# Patient Record
Sex: MALE | Race: WHITE | URBAN - METROPOLITAN AREA
[De-identification: names, ages, dates, MRNs, and addresses within clinical notes are randomized per-mention and may not be internally consistent; named-entity substitution may affect disease eponyms.]

---

## 2017-01-03 ENCOUNTER — OFFICE VISIT (OUTPATIENT)
Dept: CARDIOLOGY CLINIC | Age: 67
End: 2017-01-03

## 2017-01-03 VITALS
HEIGHT: 69 IN | WEIGHT: 183.8 LBS | DIASTOLIC BLOOD PRESSURE: 76 MMHG | RESPIRATION RATE: 16 BRPM | HEART RATE: 60 BPM | SYSTOLIC BLOOD PRESSURE: 138 MMHG | BODY MASS INDEX: 27.22 KG/M2 | OXYGEN SATURATION: 98 %

## 2017-01-03 DIAGNOSIS — Z01.818 PRE-OP EXAM: Primary | ICD-10-CM

## 2017-01-03 RX ORDER — METFORMIN HYDROCHLORIDE 500 MG/1
TABLET ORAL
Refills: 5 | COMMUNITY
Start: 2016-12-29 | End: 2017-02-16 | Stop reason: DRUGHIGH

## 2017-01-03 RX ORDER — SITAGLIPTIN 50 MG/1
TABLET, FILM COATED ORAL
Refills: 3 | COMMUNITY
Start: 2016-12-29 | End: 2017-02-16 | Stop reason: DRUGHIGH

## 2017-01-03 RX ORDER — ASPIRIN 81 MG/1
TABLET ORAL DAILY
COMMUNITY

## 2017-01-03 RX ORDER — UREA 10 %
800 LOTION (ML) TOPICAL DAILY
COMMUNITY
End: 2017-02-16

## 2017-01-03 RX ORDER — CHOLECALCIFEROL (VITAMIN D3) 125 MCG
CAPSULE ORAL DAILY
COMMUNITY

## 2017-01-03 RX ORDER — ENALAPRIL MALEATE 20 MG/1
TABLET ORAL
Refills: 1 | COMMUNITY
Start: 2016-11-19 | End: 2017-02-16 | Stop reason: DRUGHIGH

## 2017-01-03 RX ORDER — BISMUTH SUBSALICYLATE 262 MG
1 TABLET,CHEWABLE ORAL DAILY
COMMUNITY

## 2017-01-03 NOTE — PROGRESS NOTES
Visit Vitals    /76 (BP 1 Location: Left arm, BP Patient Position: Sitting)    Pulse 60    Resp 16    Ht 5' 9\" (1.753 m)    Wt 183 lb 12.8 oz (83.4 kg)    SpO2 98%    BMI 27.14 kg/m2

## 2017-01-03 NOTE — PROGRESS NOTES
LAST OFFICE VISIT : Visit date not found        ICD-10-CM ICD-9-CM   1. Pre-op exam Z01.818 V72.84            Emily Lowery is a 77 y.o. male new patient referred for cardiac pre operative risk stratification. Cardiac risk factors: male gender, hypertension, diabetes mellitus. I have personally obtained the history from the patient. HISTORY OF PRESENTING ILLNESS      Presents for cardiac pre operative risk stratification. He has been having some left leg claudication symptoms and may be having bypass of his lower extremity CHARITO are 0.84 on the right and 0.52 on the left. He has history of diabetes mellitus and hypertension. He states he continues to have pain in legs with exertion such as golfing worse in the left leg. He is a former smoker. He has been a diabetic for 17 years for which he is on metformin. Hemoglobin A1C recently was 8.4. He had negative heart catheterization in the past with Dr Joao Geronimo. The patient denies chest pain/ shortness of breath, orthopnea, PND, LE edema, palpitations, syncope, presyncope or fatigue. ACTIVE PROBLEM LIST     There are no active problems to display for this patient.           PAST MEDICAL HISTORY     Past Medical History   Diagnosis Date    Diabetes ([facility] [address]     Essential hypertension     Vertigo            PAST SURGICAL HISTORY     Past Surgical History   Procedure Laterality Date    Hx cyst removal       Pilodinal, 1972    Hx heart catheterization       1999    Hx cataract removal Left 03/15/2016    Hx cataract removal Right 05/15/2016    Hx other surgical       Angiogram          ALLERGIES     No Known Allergies       FAMILY HISTORY     Family History   Problem Relation Age of Onset    Diabetes Father     Heart Surgery Father     Hypertension Father     Diabetes Brother     Hypertension Brother     negative for cardiac disease       SOCIAL HISTORY     Social History     Social History    Marital status:      Spouse name: N/A   Sergio Number of children: N/A    Years of education: N/A     Social History Main Topics    Smoking status: Former Smoker     Packs/day: 2.00     Years: 27.00     Quit date: 1/3/2001    Smokeless tobacco: None    Alcohol use 6.0 oz/week     10 Cans of beer per week      Comment: light beer    Drug use: No    Sexual activity: Yes     Other Topics Concern    None     Social History Narrative    None         MEDICATIONS     Current Outpatient Prescriptions   Medication Sig    enalapril (VASOTEC) 20 mg tablet TK 1 T PO  BID    metFORMIN (GLUCOPHAGE) 500 mg tablet TK 2 TS PO BID WITH MEALS    JANUVIA 50 mg tablet TK 1 T PO QD    aspirin delayed-release 81 mg tablet Take  by mouth daily.  cholecalciferol, vitamin D3, (VITAMIN D3) 2,000 unit tab Take  by mouth daily.  omega-3 fatty acids-vitamin e (FISH OIL) 1,000 mg cap Take 1 Cap by mouth two (2) times a day.  folic acid 626 mcg tablet Take 800 mcg by mouth daily.  multivitamin (ONE A DAY) tablet Take 1 Tab by mouth daily. No current facility-administered medications for this visit. I have reviewed the nurses notes, vitals, problem list, allergy list, medical history, family, social history and medications. REVIEW OF SYMPTOMS      General: Pt denies excessive weight gain or loss. Pt is able to conduct ADL's  HEENT: Denies blurred vision, headaches, hearing loss, epistaxis and difficulty swallowing. Respiratory: Denies cough, congestion, shortness of breath, STARKEY, wheezing or stridor.   Cardiovascular: Denies precordial pain, palpitations, edema or PND  Gastrointestinal: Denies poor appetite, indigestion, abdominal pain or blood in stool  Genitourinary: Denies hematuria, dysuria, increased urinary frequency  Musculoskeletal: Denies joint pain or swelling from muscles or joints  Neurologic: Denies tremor, paresthesias, headache, or sensory motor disturbance  Psychiatric: Denies confusion, insomnia, depression  Integumentray: Denies rash, itching or ulcers. Hematologic: Denies easy bruising, bleeding     PHYSICAL EXAMINATION      Vitals:    01/03/17 1437 01/03/17 1441   BP: 140/82 138/76   Pulse: 60    Resp: 16    SpO2: 98%    Weight: 183 lb 12.8 oz (83.4 kg)    Height: 5' 9\" (1.753 m)      General: Well developed, in no acute distress. HEENT: No jaundice, oral mucosa moist, no oral ulcers  Neck: Supple, no stiffness, no lymphadenopathy, supple  Heart:  Normal S1/S2 negative S3 or S4. Regular, no murmur, gallop or rub, no jugular venous distention  Respiratory: Clear bilaterally x 4, no wheezing or rales  Abdomen:   Soft, non-tender, bowel sounds are active.   Extremities:  Minimal pulse in left leg, right leg lightly palpable. No edema, normal cap refill, no cyanosis. Musculoskeletal: No clubbing, no deformities  Neuro: A&Ox3, speech clear, gait stable, cooperative, no focal neurologic deficits  Skin: Skin color is normal. No rashes or lesions.  Non diaphoretic, moist.  Vascular: 2+ pulses symmetric in all extremities        EKG: sinus bradycardia at 57 bpm and non specific STT changes and 1 PAC     DIAGNOSTIC DATA     1) Echocardiogram:  2) Stress testing:  3) EP studies( holter,loop, Pacemaker):  4) Cardiac catheterization:  5) Vascular studies:         LABORATORY DATA          No results found for: WBC, HGBPOC, HGB, HGBP, HCTPOC, HCT, PHCT, RBCH, PLT, MCV, HGBEXT, HCTEXT, PLTEXT, HGBEXT, HCTEXT, PLTEXT   No results found for: NA, K, CL, CO2, AGAP, GLU, BUN, CREA, BUCR, GFRAA, GFRNA, CA, TBIL, TBILI, GPT, SGOT, AP, TP, ALB, GLOB, AGRAT, ALT        ASSESSMENT/RECOMMENDATIONS:.      1. Cardiac pre operative risk stratification   -I think that with his underlying risk factors including dyslipidemia, hypertension, and peripheral vascular disease I favor performing Lexiscan Cardiolite  -limited in walking due to pain in left calf  -if tests comesback normal will consider him a low risk  -if any abnormalities will proceed with cardiac catheterization 2. Diabetes mellitus   -hemoglobin A1C is in 8 range   -ideally want it closer to 5.6     3. Dyslipidemia  -he states he was on statin in past but is currently not taking one  -believe he needs to be on statin  -we will obtain last cholesterol panel and will place on statin if LDL is above 70    4. Peripheral vascular disease   -surgery planned for end of January 5. Follow up in 6 weeks or PRN    Orders Placed This Encounter    AMB POC EKG ROUTINE W/ 12 LEADS, INTER & REP     Order Specific Question:   Reason for Exam:     Answer:   routine    enalapril (VASOTEC) 20 mg tablet     Sig: TK 1 T PO  BID     Refill:  1    metFORMIN (GLUCOPHAGE) 500 mg tablet     Sig: TK 2 TS PO BID WITH MEALS     Refill:  5    JANUVIA 50 mg tablet     Sig: TK 1 T PO QD     Refill:  3    aspirin delayed-release 81 mg tablet     Sig: Take  by mouth daily.  cholecalciferol, vitamin D3, (VITAMIN D3) 2,000 unit tab     Sig: Take  by mouth daily.  omega-3 fatty acids-vitamin e (FISH OIL) 1,000 mg cap     Sig: Take 1 Cap by mouth two (2) times a day.  folic acid 401 mcg tablet     Sig: Take 800 mcg by mouth daily.  multivitamin (ONE A DAY) tablet     Sig: Take 1 Tab by mouth daily. Follow-up Disposition:  Return in about 6 months (around 7/3/2017). Cardiac risk stratification  For surgery:  He is a low operative risk for surgery based on objective and subjective criteria. I have discussed the diagnosis with  Delfino Hashimoto and the intended plan as seen in the above orders. Questions were answered concerning future plans. I have discussed medication side effects and warnings with the patient as well. Thank you,  Gene Howell MD for involving me in the care of  Delfino Hashimoto. Please do not hesitate to contact me for further questions/concerns. This note was written by charli Smith, as dictated by Zaira Gerard MD.      Brian Garcia. MD [place], [address] [place] [address] [facility]6 [facility], [address] [place] [address]     [address]      [phone] / [phone] Fax

## 2017-01-03 NOTE — MR AVS SNAPSHOT
Visit Information Date & Time Provider Department Dept. Phone Encounter #  
 1/3/2017  2:40 PM Kristofer Zepeda MD [facility] [phone] [id number] Follow-up Instructions Return in about 6 months (around 7/3/2017). Your Appointments 1/11/2017 10:00 AM  
NUCLEAR MEDICINE with NUCLEAR, TEOFILO  
[facility] ([facility] SCHEDULING) Appt Note: 1 day nai-card per dr mo dx ht 5'9 wt 183 echo at [address] [place] [address] [facility]  
[phone]  
  
   
 [address] [facility] [place] [address] [place] [id number]  
  
    
 1/11/2017  2:00 PM  
ECHO CARDIOGRAMS 2D with ECHOTEOFILO  
[facility] ([facility] SCHEDULING) Appt Note: 1 day nai-card per dr mo dx ht 5'9 wt 183 echo at [address] [place] [address] [facility]  
[phone]  
  
   
 [address] [facility] [place] [address] [place] [id number]  
  
    
 2/16/2017 10:20 AM  
ESTABLISHED PATIENT with Kristofer Zepeda MD  
[facility] ([facility] SCHEDULING) Appt Note: 6 wk fu  
 [address] [facility] [address] [place] [address] [facility]  
[address] [place] [address] [address] [place] [address] Upcoming Health Maintenance Date Due Hepatitis C Screening 1950 DTaP/Tdap/Td series (1 - Tdap) 5/2/1971 FOBT Q 1 YEAR AGE 50-75 5/2/2000 ZOSTER VACCINE AGE 60> 5/2/2010 GLAUCOMA SCREENING Q2Y 5/2/2015 Pneumococcal 65+ Low/Medium Risk (1 of 2 - PCV13) 5/2/2015 MEDICARE YEARLY EXAM 5/2/2015 INFLUENZA AGE 9 TO ADULT 8/1/2016 Allergies as of 1/3/2017  Review Complete On: 1/3/2017 By: Kristofer Zepeda MD  
 No Known Allergies Current Immunizations  Never Reviewed No immunizations on file. Not reviewed this visit You Were Diagnosed With   
  
 Codes Comments Pre-op exam    -  Primary ICD-10-CM: B11.816 ICD-9-CM: V72.84 Vitals BP Pulse Resp Height(growth percentile) Weight(growth percentile) SpO2  
 138/76 (BP 1 Location: Left arm, BP Patient Position: Sitting) 60 16 5' 9\" (1.753 m) 183 lb 12.8 oz (83.4 kg) 98% BMI Smoking Status 27.14 kg/m2 Former Smoker Vitals History BMI and BSA Data Body Mass Index Body Surface Area  
 27.14 kg/m 2 2.01 m 2 Preferred Pharmacy Pharmacy Name Phone [address] AT [address] [place] [address]. [phone] Your Updated Medication List  
  
   
This list is accurate as of: 1/3/17  3:17 PM.  Always use your most recent med list.  
  
  
  
  
 aspirin delayed-release 81 mg tablet Take  by mouth daily. enalapril 20 mg tablet Commonly known as:  Blake Morning TK 1 T PO  BID FISH OIL 1,000 mg Cap Generic drug:  omega-3 fatty acids-vitamin e Take 1 Cap by mouth two (2) times a day. folic acid 059 mcg tablet Take 800 mcg by mouth daily. JANUVIA 50 mg tablet Generic drug:  SITagliptin TK 1 T PO QD  
  
 metFORMIN 500 mg tablet Commonly known as:  GLUCOPHAGE  
TK 2 TS PO BID WITH MEALS  
  
 multivitamin tablet Commonly known as:  ONE A DAY Take 1 Tab by mouth daily. VITAMIN D3 2,000 unit Tab Generic drug:  cholecalciferol (vitamin D3) Take  by mouth daily. Follow-up Instructions Return in about 6 months (around 7/3/2017). Introducing [facility]! Juan Sellers introduces [employer] patient portal. Now you can access parts of your medical record, email your doctor's office, and request medication refills online. 1. In your internet browser, go to [email or web address] 2. Click on the First Time User? Click Here link in the Sign In box. You will see the New Member Sign Up page. 3. Enter your [employer] Access Code exactly as it appears below. You will not need to use this code after youve completed the sign-up process. If you do not sign up before the expiration date, you must request a new code. · [employer] Access Code: [email or web address] Expires: 4/3/2017  2:42 PM 
 
4. Enter the last four digits of your Social Security Number (xxxx) and Date of Birth (mm/dd/yyyy) as indicated and click Submit. You will be taken to the next sign-up page. 5. Create a [employer] ID. This will be your [employer] login ID and cannot be changed, so think of one that is secure and easy to remember. 6. Create a [employer] password. You can change your password at any time. 7. Enter your Password Reset Question and Answer. This can be used at a later time if you forget your password. 8. Enter your e-mail address. You will receive e-mail notification when new information is available in [address] 9. Click Sign Up. You can now view and download portions of your medical record. 10. Click the Download Summary menu link to download a portable copy of your medical information. If you have questions, please visit the Frequently Asked Questions section of the [employer] website. Remember, [employer] is NOT to be used for urgent needs. For medical emergencies, dial 911. Now available from your iPhone and Android! Please provide this summary of care documentation to your next provider. Your primary care clinician is listed as [address]. If you have any questions after today's visit, please call [phone].

## 2017-01-04 DIAGNOSIS — E78.00 HYPERCHOLESTEREMIA: Primary | ICD-10-CM

## 2017-01-04 RX ORDER — SIMVASTATIN 10 MG/1
TABLET, FILM COATED ORAL
COMMUNITY
End: 2017-01-04 | Stop reason: SDUPTHER

## 2017-01-04 RX ORDER — SIMVASTATIN 10 MG/1
10 TABLET, FILM COATED ORAL
Qty: 30 TAB | Refills: 6 | Status: SHIPPED | OUTPATIENT
Start: 2017-01-04 | End: 2017-02-17 | Stop reason: ALTCHOICE

## 2017-01-11 ENCOUNTER — CLINICAL SUPPORT (OUTPATIENT)
Dept: CARDIOLOGY CLINIC | Age: 67
End: 2017-01-11

## 2017-01-11 DIAGNOSIS — Z01.818 PRE-OP EVALUATION: Primary | ICD-10-CM

## 2017-01-11 DIAGNOSIS — Z01.810 PREOP CARDIOVASCULAR EXAM: Primary | ICD-10-CM

## 2017-01-11 DIAGNOSIS — I34.0 NON-RHEUMATIC MITRAL REGURGITATION: ICD-10-CM

## 2017-01-11 NOTE — PROGRESS NOTES
The patient was identified by name and date of birth. The test was explained to patient and questions were answered prior to testing.

## 2017-01-11 NOTE — PROGRESS NOTES
ID verified per protocol. Dr. Fred Soto ordered study and Dr. Keyanna Pruitt read study. See scanned report.

## 2017-01-12 ENCOUNTER — DOCUMENTATION ONLY (OUTPATIENT)
Dept: CARDIOLOGY CLINIC | Age: 67
End: 2017-01-12

## 2017-01-17 ENCOUNTER — TELEPHONE (OUTPATIENT)
Dept: CARDIOLOGY CLINIC | Age: 67
End: 2017-01-17

## 2017-01-17 NOTE — TELEPHONE ENCOUNTER
Please call Holli with [facility], patient is scheduled for surgery on Friday, 1/20/17. Phone [phone], Fax [phone]. She needs clearance and results of nuclear.      Thank you, Ratna Austin

## 2017-01-18 NOTE — TELEPHONE ENCOUNTER
Pt needs cardiac clearance for vascular surgery 1/20/17.  Recent Echo had normal EF and NM stress was normal.

## 2017-01-19 ENCOUNTER — TELEPHONE (OUTPATIENT)
Dept: CARDIOLOGY CLINIC | Age: 67
End: 2017-01-19

## 2017-01-19 NOTE — TELEPHONE ENCOUNTER
Holli villeda from [facility]. Requests the clearance note and nuclear stress test results. Informed nurse that information had been faxed to Dr Sergio Bazzi office. Fax number [phone]. Phone number [phone]. Thanks!

## 2017-02-16 ENCOUNTER — OFFICE VISIT (OUTPATIENT)
Dept: CARDIOLOGY CLINIC | Age: 67
End: 2017-02-16

## 2017-02-16 VITALS
OXYGEN SATURATION: 97 % | SYSTOLIC BLOOD PRESSURE: 158 MMHG | DIASTOLIC BLOOD PRESSURE: 78 MMHG | BODY MASS INDEX: 26.9 KG/M2 | HEIGHT: 69 IN | HEART RATE: 64 BPM | WEIGHT: 181.6 LBS

## 2017-02-16 DIAGNOSIS — I34.0 NON-RHEUMATIC MITRAL REGURGITATION: Primary | ICD-10-CM

## 2017-02-16 DIAGNOSIS — E78.5 DYSLIPIDEMIA: ICD-10-CM

## 2017-02-16 DIAGNOSIS — Z13.1 DM (DIABETES MELLITUS SCREEN): ICD-10-CM

## 2017-02-16 RX ORDER — CLOPIDOGREL BISULFATE 75 MG/1
TABLET ORAL DAILY
COMMUNITY

## 2017-02-16 RX ORDER — METFORMIN HYDROCHLORIDE 500 MG/1
1000 TABLET ORAL 2 TIMES DAILY WITH MEALS
COMMUNITY

## 2017-02-16 RX ORDER — ROSUVASTATIN CALCIUM 10 MG/1
10 TABLET, COATED ORAL DAILY
Qty: 30 TAB | Refills: 4
Start: 2017-02-16 | End: 2017-02-17 | Stop reason: ALTCHOICE

## 2017-02-16 RX ORDER — ENALAPRIL MALEATE 20 MG/1
20 TABLET ORAL 2 TIMES DAILY
COMMUNITY

## 2017-02-16 NOTE — PROGRESS NOTES
LAST OFFICE VISIT : 1/11/2017      ICD-10-CM ICD-9-CM   1. Non-rheumatic mitral regurgitation I34.0 424.0   2. DM (diabetes mellitus screen) Z13.1 V77.1   3. Dyslipidemia E78.5 272.4      Poncho Soliz is a 77 y.o. male with diabetes mellitus and dyslipidemia referred for 6 week follow up. Cardiac risk factors: diabetes mellitus, male gender, hypertension  I have personally obtained the history from the patient. HISTORY OF PRESENTING ILLNESS      Overall, Mr. Yoselin Cabello is doing well with no major cardiac complaints. He notes dyspnea and mild chest discomfort after taking Simvastatin. He's discontinued this medication for the past 10-12 days with no recurrent dyspnea or chest pain. He has tried TriCor in the past with success. He takes Plavix daily and would like to whether if it is more appropriate to take this in the morning vs. evening. He does not take any other multivitamins. He's never had calcium scoring in the past.     He continues to walk about 2.5 miles daily despite recent left knee replacement. He denies any exertional symptoms. No claudication symptoms. The patient denies chest pain/ shortness of breath, orthopnea, PND, LE edema, palpitations, syncope, presyncope or fatigue.        ACTIVE PROBLEM LIST     Patient Active Problem List    Diagnosis Date Noted    DM (diabetes mellitus screen) 02/16/2017    Dyslipidemia 02/16/2017       PAST MEDICAL HISTORY     Past Medical History   Diagnosis Date    Diabetes ([facility] [address]     Essential hypertension     Vertigo            PAST SURGICAL HISTORY     Past Surgical History   Procedure Laterality Date    Hx cyst removal       Pilodinal, 1972    Hx heart catheterization       1999    Hx cataract removal Left 03/15/2016    Hx cataract removal Right 05/15/2016    Hx other surgical       Angiogram          ALLERGIES     No Known Allergies       FAMILY HISTORY     Family History   Problem Relation Age of Onset    Diabetes Father     Heart Surgery Father     Hypertension Father     Diabetes Brother     Hypertension Brother     negative for cardiac disease       SOCIAL HISTORY     Social History     Social History    Marital status:      Spouse name: N/A    Number of children: N/A    Years of education: N/A     Social History Main Topics    Smoking status: Former Smoker     Packs/day: 2.00     Years: 27.00     Quit date: 1/3/2001    Smokeless tobacco: None    Alcohol use 6.0 oz/week     10 Cans of beer per week      Comment: light beer    Drug use: No    Sexual activity: Yes     Other Topics Concern    None     Social History Narrative         MEDICATIONS     Current Outpatient Prescriptions   Medication Sig    clopidogrel (PLAVIX) 75 mg tab Take  by mouth daily.  enalapril (VASOTEC) 20 mg tablet Take 20 mg by mouth two (2) times a day.  SITagliptin (JANUVIA) 50 mg tablet Take 50 mg by mouth daily.  metFORMIN (GLUCOPHAGE) 500 mg tablet Take 1,000 mg by mouth two (2) times daily (with meals).  simvastatin (ZOCOR) 10 mg tablet Take 1 Tab by mouth nightly.  aspirin delayed-release 81 mg tablet Take  by mouth daily.  cholecalciferol, vitamin D3, (VITAMIN D3) 2,000 unit tab Take  by mouth daily.  omega-3 fatty acids-vitamin e (FISH OIL) 1,000 mg cap Take 1 Cap by mouth daily.  multivitamin (ONE A DAY) tablet Take 1 Tab by mouth daily. No current facility-administered medications for this visit. I have reviewed the nurses notes, vitals, problem list, allergy list, medical history, family, social history and medications. REVIEW OF SYMPTOMS      General: Pt denies excessive weight gain or loss. Pt is able to conduct ADL's  HEENT: Denies blurred vision, headaches, hearing loss, epistaxis and difficulty swallowing. Respiratory: Denies cough, congestion, wheezing or stridor. Positive for dyspnea. Cardiovascular: Denies precordial pain, palpitations, edema or PND. Positive for chest pain.    Gastrointestinal: Denies poor appetite, indigestion, abdominal pain or blood in stool  Genitourinary: Denies hematuria, dysuria, increased urinary frequency  Musculoskeletal: Denies joint pain or swelling from muscles or joints  Neurologic: Denies tremor, paresthesias, headache, or sensory motor disturbance  Psychiatric: Denies confusion, insomnia, depression  Integumentray: Denies rash, itching or ulcers. Hematologic: Denies easy bruising, bleeding     PHYSICAL EXAMINATION      Vitals:    02/16/17 1037   BP: 158/78   Pulse: 64   SpO2: 97%   Weight: 181 lb 9.6 oz (82.4 kg)   Height: 5' 9\" (1.753 m)     General: Well developed, in no acute distress. HEENT: No jaundice, oral mucosa moist, no oral ulcers  Neck: Supple, no stiffness, no lymphadenopathy, supple  Heart:  Normal S1/S2 negative S3 or S4. Regular, no murmur, gallop or rub, no jugular venous distention  Respiratory: Clear bilaterally x 4, no wheezing or rales. Extremities:  No edema, normal cap refill, no cyanosis. +left knee replacement, scar from left leg revascularization healing nicely. Musculoskeletal: No clubbing, deformities  Neuro: A&Ox3, speech clear, gait stable, cooperative, no focal neurologic deficits  Skin: Skin color is normal. No rashes or lesions. Non diaphoretic, moist.  Vascular: 2+ pulses symmetric in all extremities     DIAGNOSTIC DATA     1. Echo  1/11/17- EF 55-60%, MR mild    2. Lexiscan  1/11/17- no ischemia, EF 45%    3. Lipids  (08/09/16)- , HDL 38, , . LABORATORY DATA      No results found for: WBC, HGBPOC, HGB, HGBP, HCTPOC, HCT, PHCT, RBCH, PLT, MCV, HGBEXT, HCTEXT, PLTEXT   No results found for: NA, K, CL, CO2, AGAP, GLU, BUN, CREA, BUCR, GFRAA, GFRNA, CA, TBIL, TBILI, GPT, SGOT, AP, TP, ALB, GLOB, AGRAT, ALT      ASSESSMENT/RECOMMENDATIONS:.      1. Dyslipidemia  -LDL is elevated today, goal is 70.   -Will start him on Crestor 10mg.    -Recheck cholesterol in  8 weeks.   -gave him information regarding calcium score despite having a normal stress test.     2. PVD  -He's had his surgery on left leg with good blood flow. Walking 2.5 miles everyday. 3. DM  -HgA1c needs to be close to 5.6%. Continue exercise and diet. 4. Return in 6 months or PRN. Orders Placed This Encounter    clopidogrel (PLAVIX) 75 mg tab     Sig: Take  by mouth daily.  enalapril (VASOTEC) 20 mg tablet     Sig: Take 20 mg by mouth two (2) times a day.  SITagliptin (JANUVIA) 50 mg tablet     Sig: Take 50 mg by mouth daily.  metFORMIN (GLUCOPHAGE) 500 mg tablet     Sig: Take 1,000 mg by mouth two (2) times daily (with meals). Follow-up Disposition: Not on File      I have discussed the diagnosis with  Randell Orona and the intended plan as seen in the above orders. Questions were answered concerning future plans. I have discussed medication side effects and warnings with the patient as well. Thank you,  Asia Austin MD for involving me in the care of  Randell Orona. Please do not hesitate to contact me for further questions/concerns. Written by Aide Ramirez, as dictated by Abby Patterson MD.      Soumya Guadalupe MD, [address] [place] [address] Rd[address]      [facility], [address] [place] [address] Drive      [phone] / [phone] Fax

## 2017-02-16 NOTE — PROGRESS NOTES
Visit Vitals    /78 (BP 1 Location: Left arm)    Pulse 64    Ht 5' 9\" (1.753 m)    Wt 181 lb 9.6 oz (82.4 kg)    SpO2 97%    BMI 26.82 kg/m2       Medication changes are per verbal order of Dr. Kurt Major.

## 2017-02-16 NOTE — PATIENT INSTRUCTIONS
Please visit [email or web address] for more information on multivitamins including Co-enzyme Q10 200mg.

## 2017-02-16 NOTE — MR AVS SNAPSHOT
Visit Information Date & Time Provider Department Dept. Phone Encounter #  
 2/16/2017 10:20 AM Yumiko Cedillo MD [facility] Jose Mckeon [phone] [id number] Upcoming Health Maintenance Date Due Hepatitis C Screening 1950 DTaP/Tdap/Td series (1 - Tdap) 5/2/1971 FOBT Q 1 YEAR AGE 50-75 5/2/2000 ZOSTER VACCINE AGE 60> 5/2/2010 GLAUCOMA SCREENING Q2Y 5/2/2015 Pneumococcal 65+ Low/Medium Risk (1 of 2 - PCV13) 5/2/2015 MEDICARE YEARLY EXAM 5/2/2015 INFLUENZA AGE 9 TO ADULT 8/1/2016 Allergies as of 2/16/2017  Review Complete On: 2/16/2017 By: Eddie Manjarerz No Known Allergies Current Immunizations  Never Reviewed No immunizations on file. Not reviewed this visit You Were Diagnosed With   
  
 Codes Comments Non-rheumatic mitral regurgitation    -  Primary ICD-10-CM: I34.0 ICD-9-CM: 424.0 DM (diabetes mellitus screen)     ICD-10-CM: Z13.1 ICD-9-CM: V77.1 Dyslipidemia     ICD-10-CM: E78.5 ICD-9-CM: 272.4 Vitals BP Pulse Height(growth percentile) Weight(growth percentile) SpO2 BMI  
 158/78 (BP 1 Location: Left arm) 64 5' 9\" (1.753 m) 181 lb 9.6 oz (82.4 kg) 97% 26.82 kg/m2 Smoking Status Former Smoker Vitals History BMI and BSA Data Body Mass Index Body Surface Area  
 26.82 kg/m 2 2 m 2 Preferred Pharmacy Pharmacy Name Phone [address] [facility] AT [address] [place] [address]. [phone] Your Updated Medication List  
  
   
This list is accurate as of: 2/16/17 11:33 AM.  Always use your most recent med list.  
  
  
  
  
 aspirin delayed-release 81 mg tablet Take  by mouth daily. FISH OIL 1,000 mg Cap Generic drug:  omega-3 fatty acids-vitamin e Take 1 Cap by mouth daily. JANUVIA 50 mg tablet Generic drug:  SITagliptin Take 50 mg by mouth daily. metFORMIN 500 mg tablet Commonly known as:  GLUCOPHAGE Take 1,000 mg by mouth two (2) times daily (with meals). multivitamin tablet Commonly known as:  ONE A DAY Take 1 Tab by mouth daily. PLAVIX 75 mg Tab Generic drug:  clopidogrel Take  by mouth daily. simvastatin 10 mg tablet Commonly known as:  ZOCOR Take 1 Tab by mouth nightly. VASOTEC 20 mg tablet Generic drug:  enalapril Take 20 mg by mouth two (2) times a day. VITAMIN D3 2,000 unit Tab Generic drug:  cholecalciferol (vitamin D3) Take  by mouth daily. Patient Instructions Please visit [email or web address] for more information on multivitamins including Co-enzyme Q10 200mg. Introducing hospitals & [facility] SERVICES! Dear Anali Domingo: 
Thank you for requesting a [employer] account. Our records indicate that you already have an active [employer] account. You can access your account anytime at [email or web address] Did you know that you can access your hospital and ER discharge instructions at any time in [employer]? You can also review all of your test results from your hospital stay or ER visit. Additional Information If you have questions, please visit the Frequently Asked Questions section of the [employer] website at [email or web address] Remember, [employer] is NOT to be used for urgent needs. For medical emergencies, dial 911. Now available from your iPhone and Android! Please provide this summary of care documentation to your next provider. Your primary care clinician is listed as [address]. If you have any questions after today's visit, please call [phone].

## 2017-02-17 ENCOUNTER — TELEPHONE (OUTPATIENT)
Dept: CARDIOLOGY CLINIC | Age: 67
End: 2017-02-17

## 2017-02-17 RX ORDER — ATORVASTATIN CALCIUM 10 MG/1
TABLET, FILM COATED ORAL DAILY
COMMUNITY
End: 2017-02-17 | Stop reason: SDUPTHER

## 2017-02-17 RX ORDER — ATORVASTATIN CALCIUM 10 MG/1
10 TABLET, FILM COATED ORAL DAILY
Qty: 30 TAB | Refills: 6 | Status: SHIPPED | OUTPATIENT
Start: 2017-02-17 | End: 2017-05-19 | Stop reason: SINTOL

## 2017-02-17 NOTE — TELEPHONE ENCOUNTER
Pt called and stated he needs an alternate drug sent to [facility] pharmacy. The rosuvastatin is not going to be covered under his medicare. He can be reached at [phone]. Thanks!

## 2017-02-17 NOTE — TELEPHONE ENCOUNTER
MD Ezra Bourgeois LPN       Caller: Unspecified (Today,  8:51 AM)                     lipitor 10 mg a day            Previous Messages

## 2017-02-22 ENCOUNTER — HOSPITAL ENCOUNTER (OUTPATIENT)
Dept: CT IMAGING | Age: 67
Discharge: HOME | End: 2017-02-22
Attending: SPECIALIST
Payer: SELF-PAY

## 2017-02-22 DIAGNOSIS — Z00.00 PREVENTATIVE HEALTH CARE: ICD-10-CM

## 2017-02-22 DIAGNOSIS — E78.00 HYPERCHOLESTEREMIA: Primary | ICD-10-CM

## 2017-02-22 PROCEDURE — 75571 CT HRT W/O DYE W/CA TEST: CPT

## 2017-02-22 NOTE — CARDIO/PULMONARY
Cardiac Rehab: Chart reviewed. Cardio CT score 1369. Pt saw Dr Velma Lucio on 2/16/17. Spoke with Dr Josias Ricci nurse to confirm that cardiologist has notified pt of high score. Pt has had a recent normal stress test (1/11/17). Per nurse, letter has been dictated to pt with results and will be sent with prescription for new statin. Sent copy of report to patient, with handout on learning about CAD. No f/u by cardiac rehab RN is indicated.

## 2017-03-03 RX ORDER — ASCORBIC ACID 250 MG
TABLET ORAL
COMMUNITY

## 2017-04-18 ENCOUNTER — HOSPITAL ENCOUNTER (OUTPATIENT)
Dept: LAB | Age: 67
Discharge: HOME | End: 2017-04-18
Payer: MEDICARE

## 2017-04-18 PROCEDURE — 80076 HEPATIC FUNCTION PANEL: CPT

## 2017-04-18 PROCEDURE — 36415 COLL VENOUS BLD VENIPUNCTURE: CPT

## 2017-04-18 PROCEDURE — 80061 LIPID PANEL: CPT

## 2017-04-19 LAB
ALBUMIN SERPL-MCNC: 4.4 G/DL (ref 3.6–4.8)
ALP SERPL-CCNC: 53 IU/L (ref 39–117)
ALT SERPL-CCNC: 27 IU/L (ref 0–44)
AST SERPL-CCNC: 22 IU/L (ref 0–40)
BILIRUB DIRECT SERPL-MCNC: 0.13 MG/DL (ref 0–0.4)
BILIRUB SERPL-MCNC: 0.5 MG/DL (ref 0–1.2)
CHOLEST SERPL-MCNC: 163 MG/DL (ref 100–199)
HDLC SERPL-MCNC: 35 MG/DL
INTERPRETATION, 910389: NORMAL
LDLC SERPL CALC-MCNC: 100 MG/DL (ref 0–99)
PROT SERPL-MCNC: 7 G/DL (ref 6–8.5)
TRIGL SERPL-MCNC: 139 MG/DL (ref 0–149)
VLDLC SERPL CALC-MCNC: 28 MG/DL (ref 5–40)

## 2017-05-12 DIAGNOSIS — E78.5 DYSLIPIDEMIA: Primary | ICD-10-CM

## 2017-05-15 ENCOUNTER — TELEPHONE (OUTPATIENT)
Dept: CARDIOLOGY CLINIC | Age: 67
End: 2017-05-15

## 2019-09-25 PROBLEM — Z13.1 DM (DIABETES MELLITUS SCREEN): Status: RESOLVED | Noted: 2017-02-16 | Resolved: 2019-09-25

## 2021-07-07 ENCOUNTER — HOSPITAL ENCOUNTER (OUTPATIENT)
Dept: CARDIAC REHAB | Age: 71
Discharge: HOME | End: 2021-07-07
Payer: MEDICARE

## 2021-07-07 VITALS — BODY MASS INDEX: 27.33 KG/M2 | WEIGHT: 184.5 LBS | HEIGHT: 69 IN

## 2021-07-07 PROCEDURE — 93798 PHYS/QHP OP CAR RHAB W/ECG: CPT

## 2021-07-07 RX ORDER — LEVOTHYROXINE SODIUM 75 UG/1
75 TABLET ORAL
COMMUNITY

## 2021-07-07 RX ORDER — METOPROLOL SUCCINATE 50 MG/1
50 TABLET, EXTENDED RELEASE ORAL DAILY
COMMUNITY

## 2021-07-07 NOTE — CARDIO/PULMONARY
[address] Cardiac Rehab  Intake Note  Etienne PINA 1950 presented for cardiac rehab orientation and exercise tolerance test today with a primary diagnosis of CABG X 4.  Pt has history of bilateral PAD with surgical intervention. LVEF is 50% via PURVI on 21. Cardiac risk factors include: age, gender, HTN, HLD, DM. CAD risk factors were reviewed with patient. Pt is   and lives with his wife. 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 He finds this job very relaxing. Depression score PHQ9 is 2 and this is considered normal.    Patient denied chest pain or SOB during 6 minute walk on treadmill at 1.5 mph, with RPE 11. Cardiac rhythm was SR with freq PVCs, runs of trigeminy noted (pt has a history of VT). Exercise plan was developed. Pt will attend cardiac rehab 2-3 times per week. Cardiac Rehab book reviewed and given to patient.    Funmilayo Cardoso RN

## 2021-07-09 ENCOUNTER — HOSPITAL ENCOUNTER (OUTPATIENT)
Dept: CARDIAC REHAB | Age: 71
Discharge: HOME | End: 2021-07-09
Payer: MEDICARE

## 2021-07-09 VITALS — BODY MASS INDEX: 27.57 KG/M2 | WEIGHT: 184 LBS

## 2021-07-09 PROCEDURE — 93798 PHYS/QHP OP CAR RHAB W/ECG: CPT

## 2021-07-12 ENCOUNTER — HOSPITAL ENCOUNTER (OUTPATIENT)
Dept: CARDIAC REHAB | Age: 71
Discharge: HOME | End: 2021-07-12
Payer: MEDICARE

## 2021-07-12 VITALS — BODY MASS INDEX: 27.51 KG/M2 | WEIGHT: 183.6 LBS

## 2021-07-12 PROCEDURE — 93798 PHYS/QHP OP CAR RHAB W/ECG: CPT

## 2021-07-14 ENCOUNTER — HOSPITAL ENCOUNTER (OUTPATIENT)
Dept: CARDIAC REHAB | Age: 71
Discharge: HOME | End: 2021-07-14
Payer: MEDICARE

## 2021-07-14 VITALS — BODY MASS INDEX: 27.42 KG/M2 | WEIGHT: 183 LBS

## 2021-07-14 PROCEDURE — 93798 PHYS/QHP OP CAR RHAB W/ECG: CPT

## 2021-07-16 ENCOUNTER — HOSPITAL ENCOUNTER (OUTPATIENT)
Dept: CARDIAC REHAB | Age: 71
Discharge: HOME | End: 2021-07-16
Payer: MEDICARE

## 2021-07-16 VITALS — BODY MASS INDEX: 27.33 KG/M2 | WEIGHT: 182.4 LBS

## 2021-07-16 PROCEDURE — 93798 PHYS/QHP OP CAR RHAB W/ECG: CPT

## 2021-07-19 ENCOUNTER — HOSPITAL ENCOUNTER (OUTPATIENT)
Dept: CARDIAC REHAB | Age: 71
Discharge: HOME | End: 2021-07-19
Payer: MEDICARE

## 2021-07-19 VITALS — WEIGHT: 184.2 LBS | BODY MASS INDEX: 27.6 KG/M2

## 2021-07-19 PROCEDURE — 93798 PHYS/QHP OP CAR RHAB W/ECG: CPT

## 2021-07-21 ENCOUNTER — HOSPITAL ENCOUNTER (OUTPATIENT)
Dept: CARDIAC REHAB | Age: 71
Discharge: HOME | End: 2021-07-21
Payer: MEDICARE

## 2021-07-21 VITALS — WEIGHT: 184.6 LBS | BODY MASS INDEX: 27.66 KG/M2

## 2021-07-21 PROCEDURE — 93798 PHYS/QHP OP CAR RHAB W/ECG: CPT

## 2021-07-23 ENCOUNTER — HOSPITAL ENCOUNTER (OUTPATIENT)
Dept: CARDIAC REHAB | Age: 71
Discharge: HOME | End: 2021-07-23
Payer: MEDICARE

## 2021-07-23 VITALS — BODY MASS INDEX: 27.39 KG/M2 | WEIGHT: 182.8 LBS

## 2021-07-23 PROCEDURE — 93798 PHYS/QHP OP CAR RHAB W/ECG: CPT

## 2021-08-04 ENCOUNTER — HOSPITAL ENCOUNTER (OUTPATIENT)
Dept: CARDIAC REHAB | Age: 71
Discharge: HOME | End: 2021-08-04
Payer: MEDICARE

## 2021-08-04 VITALS — BODY MASS INDEX: 27.51 KG/M2 | WEIGHT: 183.6 LBS

## 2021-08-04 PROCEDURE — 93798 PHYS/QHP OP CAR RHAB W/ECG: CPT

## 2021-08-06 ENCOUNTER — HOSPITAL ENCOUNTER (OUTPATIENT)
Dept: CARDIAC REHAB | Age: 71
Discharge: HOME | End: 2021-08-06
Payer: MEDICARE

## 2021-08-06 VITALS — WEIGHT: 183 LBS | BODY MASS INDEX: 27.42 KG/M2

## 2021-08-06 PROCEDURE — 93798 PHYS/QHP OP CAR RHAB W/ECG: CPT

## 2021-08-09 ENCOUNTER — HOSPITAL ENCOUNTER (OUTPATIENT)
Dept: CARDIAC REHAB | Age: 71
Discharge: HOME | End: 2021-08-09
Payer: MEDICARE

## 2021-08-09 VITALS — BODY MASS INDEX: 27.54 KG/M2 | WEIGHT: 183.8 LBS

## 2021-08-09 PROCEDURE — 93798 PHYS/QHP OP CAR RHAB W/ECG: CPT

## 2021-08-11 ENCOUNTER — HOSPITAL ENCOUNTER (OUTPATIENT)
Dept: CARDIAC REHAB | Age: 71
Discharge: HOME | End: 2021-08-11
Payer: MEDICARE

## 2021-08-11 VITALS — BODY MASS INDEX: 27.51 KG/M2 | WEIGHT: 183.6 LBS

## 2021-08-11 PROCEDURE — 93798 PHYS/QHP OP CAR RHAB W/ECG: CPT

## 2021-08-13 ENCOUNTER — HOSPITAL ENCOUNTER (OUTPATIENT)
Dept: CARDIAC REHAB | Age: 71
Discharge: HOME | End: 2021-08-13
Payer: MEDICARE

## 2021-08-13 VITALS — WEIGHT: 184.4 LBS | BODY MASS INDEX: 27.63 KG/M2

## 2021-08-13 PROCEDURE — 93798 PHYS/QHP OP CAR RHAB W/ECG: CPT

## 2021-08-16 ENCOUNTER — HOSPITAL ENCOUNTER (OUTPATIENT)
Dept: CARDIAC REHAB | Age: 71
Discharge: HOME | End: 2021-08-16
Payer: MEDICARE

## 2021-08-16 VITALS — BODY MASS INDEX: 27.6 KG/M2 | WEIGHT: 184.2 LBS

## 2021-08-16 PROCEDURE — 93798 PHYS/QHP OP CAR RHAB W/ECG: CPT

## 2021-08-18 ENCOUNTER — HOSPITAL ENCOUNTER (OUTPATIENT)
Dept: CARDIAC REHAB | Age: 71
Discharge: HOME | End: 2021-08-18
Payer: MEDICARE

## 2021-08-18 VITALS — WEIGHT: 184 LBS | BODY MASS INDEX: 27.57 KG/M2

## 2021-08-18 PROCEDURE — 93798 PHYS/QHP OP CAR RHAB W/ECG: CPT

## 2021-08-20 ENCOUNTER — HOSPITAL ENCOUNTER (OUTPATIENT)
Dept: CARDIAC REHAB | Age: 71
Discharge: HOME | End: 2021-08-20
Payer: MEDICARE

## 2021-08-20 VITALS — BODY MASS INDEX: 27.6 KG/M2 | WEIGHT: 184.2 LBS

## 2021-08-20 PROCEDURE — 93798 PHYS/QHP OP CAR RHAB W/ECG: CPT

## 2021-08-23 ENCOUNTER — HOSPITAL ENCOUNTER (OUTPATIENT)
Dept: CARDIAC REHAB | Age: 71
Discharge: HOME | End: 2021-08-23
Payer: MEDICARE

## 2021-08-23 VITALS — WEIGHT: 184 LBS | BODY MASS INDEX: 27.57 KG/M2

## 2021-08-23 PROCEDURE — 93798 PHYS/QHP OP CAR RHAB W/ECG: CPT

## 2021-08-25 ENCOUNTER — HOSPITAL ENCOUNTER (OUTPATIENT)
Dept: CARDIAC REHAB | Age: 71
Discharge: HOME | End: 2021-08-25
Payer: MEDICARE

## 2021-08-25 VITALS — WEIGHT: 184 LBS | BODY MASS INDEX: 27.57 KG/M2

## 2021-08-25 PROCEDURE — 93798 PHYS/QHP OP CAR RHAB W/ECG: CPT

## 2021-08-27 ENCOUNTER — HOSPITAL ENCOUNTER (OUTPATIENT)
Dept: CARDIAC REHAB | Age: 71
Discharge: HOME | End: 2021-08-27
Payer: MEDICARE

## 2021-08-27 VITALS — BODY MASS INDEX: 27.51 KG/M2 | WEIGHT: 183.6 LBS

## 2021-08-27 PROCEDURE — 93798 PHYS/QHP OP CAR RHAB W/ECG: CPT

## 2021-08-30 ENCOUNTER — HOSPITAL ENCOUNTER (OUTPATIENT)
Dept: CARDIAC REHAB | Age: 71
Discharge: HOME | End: 2021-08-30
Payer: MEDICARE

## 2021-08-30 VITALS — WEIGHT: 184.2 LBS | BODY MASS INDEX: 27.6 KG/M2

## 2021-08-30 PROCEDURE — 93798 PHYS/QHP OP CAR RHAB W/ECG: CPT

## 2021-09-01 ENCOUNTER — HOSPITAL ENCOUNTER (OUTPATIENT)
Dept: CARDIAC REHAB | Age: 71
Discharge: HOME | End: 2021-09-01
Payer: MEDICARE

## 2021-09-01 VITALS — BODY MASS INDEX: 27.63 KG/M2 | WEIGHT: 184.4 LBS

## 2021-09-01 PROCEDURE — 93798 PHYS/QHP OP CAR RHAB W/ECG: CPT

## 2021-09-03 ENCOUNTER — HOSPITAL ENCOUNTER (OUTPATIENT)
Dept: CARDIAC REHAB | Age: 71
Discharge: HOME | End: 2021-09-03
Payer: MEDICARE

## 2021-09-03 VITALS — BODY MASS INDEX: 27.66 KG/M2 | WEIGHT: 184.6 LBS

## 2021-09-03 PROCEDURE — 93798 PHYS/QHP OP CAR RHAB W/ECG: CPT

## 2021-09-08 ENCOUNTER — HOSPITAL ENCOUNTER (OUTPATIENT)
Dept: CARDIAC REHAB | Age: 71
Discharge: HOME | End: 2021-09-08
Payer: MEDICARE

## 2021-09-08 VITALS — WEIGHT: 184.4 LBS | BODY MASS INDEX: 27.63 KG/M2

## 2021-09-08 PROCEDURE — 93798 PHYS/QHP OP CAR RHAB W/ECG: CPT

## 2021-09-10 ENCOUNTER — HOSPITAL ENCOUNTER (OUTPATIENT)
Dept: CARDIAC REHAB | Age: 71
Discharge: HOME | End: 2021-09-10
Payer: MEDICARE

## 2021-09-10 VITALS — BODY MASS INDEX: 27.54 KG/M2 | WEIGHT: 183.8 LBS

## 2021-09-10 PROCEDURE — 93798 PHYS/QHP OP CAR RHAB W/ECG: CPT

## 2021-09-13 ENCOUNTER — HOSPITAL ENCOUNTER (OUTPATIENT)
Dept: CARDIAC REHAB | Age: 71
Discharge: HOME | End: 2021-09-13
Payer: MEDICARE

## 2021-09-13 VITALS — WEIGHT: 183.4 LBS | BODY MASS INDEX: 27.48 KG/M2

## 2021-09-13 PROCEDURE — 93798 PHYS/QHP OP CAR RHAB W/ECG: CPT

## 2021-09-15 ENCOUNTER — HOSPITAL ENCOUNTER (OUTPATIENT)
Dept: CARDIAC REHAB | Age: 71
Discharge: HOME | End: 2021-09-15
Payer: MEDICARE

## 2021-09-15 VITALS — BODY MASS INDEX: 27.6 KG/M2 | WEIGHT: 184.2 LBS

## 2021-09-15 PROCEDURE — 93798 PHYS/QHP OP CAR RHAB W/ECG: CPT

## 2021-09-17 ENCOUNTER — APPOINTMENT (OUTPATIENT)
Dept: CARDIAC REHAB | Age: 71
End: 2021-09-17
Payer: MEDICARE

## 2021-09-20 ENCOUNTER — APPOINTMENT (OUTPATIENT)
Dept: CARDIAC REHAB | Age: 71
End: 2021-09-20
Payer: MEDICARE

## 2021-09-22 ENCOUNTER — APPOINTMENT (OUTPATIENT)
Dept: CARDIAC REHAB | Age: 71
End: 2021-09-22
Payer: MEDICARE

## 2021-09-24 ENCOUNTER — APPOINTMENT (OUTPATIENT)
Dept: CARDIAC REHAB | Age: 71
End: 2021-09-24
Payer: MEDICARE

## 2021-09-27 ENCOUNTER — HOSPITAL ENCOUNTER (OUTPATIENT)
Dept: CARDIAC REHAB | Age: 71
Discharge: HOME | End: 2021-09-27
Payer: MEDICARE

## 2021-09-27 VITALS — BODY MASS INDEX: 27.45 KG/M2 | WEIGHT: 183.2 LBS

## 2021-09-27 PROCEDURE — 93798 PHYS/QHP OP CAR RHAB W/ECG: CPT

## 2021-09-29 ENCOUNTER — HOSPITAL ENCOUNTER (OUTPATIENT)
Dept: CARDIAC REHAB | Age: 71
Discharge: HOME | End: 2021-09-29
Payer: MEDICARE

## 2021-09-29 ENCOUNTER — APPOINTMENT (OUTPATIENT)
Dept: CARDIAC REHAB | Age: 71
End: 2021-09-29
Payer: MEDICARE

## 2021-09-29 VITALS — WEIGHT: 183.8 LBS | BODY MASS INDEX: 27.54 KG/M2

## 2021-09-29 PROCEDURE — 93798 PHYS/QHP OP CAR RHAB W/ECG: CPT

## 2021-09-29 PROCEDURE — 93797 PHYS/QHP OP CAR RHAB WO ECG: CPT

## 2021-10-01 ENCOUNTER — APPOINTMENT (OUTPATIENT)
Dept: CARDIAC REHAB | Age: 71
End: 2021-10-01

## 2021-10-07 ENCOUNTER — APPOINTMENT (OUTPATIENT)
Dept: CARDIAC REHAB | Age: 71
End: 2021-10-07

## 2021-10-18 ENCOUNTER — TELEPHONE (OUTPATIENT)
Dept: CARDIAC REHAB | Age: 71
End: 2021-10-18

## 2021-10-19 ENCOUNTER — HOSPITAL ENCOUNTER (OUTPATIENT)
Dept: CARDIAC REHAB | Age: 71
Discharge: HOME | End: 2021-10-19

## 2021-10-21 VITALS — WEIGHT: 183 LBS | BODY MASS INDEX: 27.42 KG/M2

## 2021-10-21 NOTE — CARDIO/PULMONARY
[address] Cardiac Rehab- Discharge Note  Pt participated in Phase 2 Cardiac Rehab for 29 exercise and educational sessions from 7/7/21-9/29/21. After 9/29/21, pt did not schedule any further sessions as requested to be discharged. Pt consented to attending 1 session of a discharge paperwork completion and a 6 minute exercise tolerance test which would complete the Phase 2 program.  This session took place on 10/19/21. This was not a chargeable visit. On 9/29/21, pt's last exercise and education session, pt received documentation of visits, vital signs, heart rhythm samples for his own records and to share with his physician. On the 10/19/21 session, pt did not have any further education, nutrition, education questions. Pt is discharged from [address] [place] [address]. N.  [employer]

## 2022-03-20 PROBLEM — E78.5 DYSLIPIDEMIA: Status: ACTIVE | Noted: 2017-02-16

## 2023-05-18 RX ORDER — ASCORBIC ACID 250 MG
TABLET ORAL
COMMUNITY

## 2023-05-18 RX ORDER — EZETIMIBE 10 MG/1
10 TABLET ORAL DAILY
COMMUNITY
Start: 2017-05-19

## 2023-05-18 RX ORDER — LEVOTHYROXINE SODIUM 0.07 MG/1
75 TABLET ORAL
COMMUNITY

## 2023-05-18 RX ORDER — ENALAPRIL MALEATE 20 MG/1
20 TABLET ORAL 2 TIMES DAILY
COMMUNITY

## 2023-05-18 RX ORDER — METOPROLOL SUCCINATE 50 MG/1
50 TABLET, EXTENDED RELEASE ORAL DAILY
COMMUNITY

## 2023-05-18 RX ORDER — ASPIRIN 81 MG/1
TABLET ORAL DAILY
COMMUNITY

## 2023-05-18 RX ORDER — CLOPIDOGREL BISULFATE 75 MG/1
TABLET ORAL DAILY
COMMUNITY

## 2025-09-03 ENCOUNTER — ANESTHESIA EVENT (OUTPATIENT)
Facility: HOSPITAL | Age: 75
End: 2025-09-03
Payer: MEDICARE

## 2025-09-03 ENCOUNTER — ANESTHESIA (OUTPATIENT)
Facility: HOSPITAL | Age: 75
End: 2025-09-03
Payer: MEDICARE

## 2025-09-03 ENCOUNTER — HOSPITAL ENCOUNTER (OUTPATIENT)
Facility: HOSPITAL | Age: 75
Setting detail: OUTPATIENT SURGERY
Discharge: HOME | End: 2025-09-03
Attending: INTERNAL MEDICINE | Admitting: INTERNAL MEDICINE
Payer: MEDICARE

## 2025-09-03 VITALS
RESPIRATION RATE: 12 BRPM | BODY MASS INDEX: 25.16 KG/M2 | SYSTOLIC BLOOD PRESSURE: 109 MMHG | OXYGEN SATURATION: 100 % | DIASTOLIC BLOOD PRESSURE: 66 MMHG | HEIGHT: 68 IN | WEIGHT: 166.01 LBS | HEART RATE: 63 BPM | TEMPERATURE: 98 F

## 2025-09-03 LAB
GLUCOSE BLD STRIP.AUTO-MCNC: 115 MG/DL (ref 65–117)
SERVICE CMNT-IMP: NORMAL

## 2025-09-03 PROCEDURE — 7100000011 HC PHASE II RECOVERY - ADDTL 15 MIN: Performed by: INTERNAL MEDICINE

## 2025-09-03 PROCEDURE — 3700000000 HC ANESTHESIA ATTENDED CARE: Performed by: INTERNAL MEDICINE

## 2025-09-03 PROCEDURE — 6360000002 HC RX W HCPCS: Performed by: NURSE ANESTHETIST, CERTIFIED REGISTERED

## 2025-09-03 PROCEDURE — 82962 GLUCOSE BLOOD TEST: CPT

## 2025-09-03 PROCEDURE — 3700000001 HC ADD 15 MINUTES (ANESTHESIA): Performed by: INTERNAL MEDICINE

## 2025-09-03 PROCEDURE — 88305 TISSUE EXAM BY PATHOLOGIST: CPT

## 2025-09-03 PROCEDURE — 3600007512: Performed by: INTERNAL MEDICINE

## 2025-09-03 PROCEDURE — 3600007502: Performed by: INTERNAL MEDICINE

## 2025-09-03 PROCEDURE — 7100000010 HC PHASE II RECOVERY - FIRST 15 MIN: Performed by: INTERNAL MEDICINE

## 2025-09-03 PROCEDURE — 2580000003 HC RX 258: Performed by: INTERNAL MEDICINE

## 2025-09-03 RX ORDER — PROPOFOL 10 MG/ML
INJECTION, EMULSION INTRAVENOUS
Status: DISCONTINUED | OUTPATIENT
Start: 2025-09-03 | End: 2025-09-03 | Stop reason: SDUPTHER

## 2025-09-03 RX ORDER — MULTIVITAMIN WITH IRON
1 TABLET ORAL DAILY
COMMUNITY

## 2025-09-03 RX ORDER — LIDOCAINE HYDROCHLORIDE 20 MG/ML
INJECTION, SOLUTION EPIDURAL; INFILTRATION; INTRACAUDAL; PERINEURAL
Status: DISCONTINUED | OUTPATIENT
Start: 2025-09-03 | End: 2025-09-03 | Stop reason: SDUPTHER

## 2025-09-03 RX ORDER — SODIUM CHLORIDE 0.9 % (FLUSH) 0.9 %
5-40 SYRINGE (ML) INJECTION EVERY 12 HOURS SCHEDULED
Status: DISCONTINUED | OUTPATIENT
Start: 2025-09-03 | End: 2025-09-03 | Stop reason: HOSPADM

## 2025-09-03 RX ORDER — CARVEDILOL 25 MG/1
25 TABLET ORAL 2 TIMES DAILY WITH MEALS
COMMUNITY

## 2025-09-03 RX ORDER — SODIUM CHLORIDE 0.9 % (FLUSH) 0.9 %
5-40 SYRINGE (ML) INJECTION PRN
Status: DISCONTINUED | OUTPATIENT
Start: 2025-09-03 | End: 2025-09-03 | Stop reason: HOSPADM

## 2025-09-03 RX ORDER — ROSUVASTATIN CALCIUM 10 MG/1
10 TABLET, COATED ORAL DAILY
COMMUNITY

## 2025-09-03 RX ORDER — SODIUM CHLORIDE 9 MG/ML
INJECTION, SOLUTION INTRAVENOUS PRN
Status: DISCONTINUED | OUTPATIENT
Start: 2025-09-03 | End: 2025-09-03 | Stop reason: HOSPADM

## 2025-09-03 RX ADMIN — PROPOFOL 50 MG: 10 INJECTION, EMULSION INTRAVENOUS at 08:22

## 2025-09-03 RX ADMIN — PROPOFOL 50 MG: 10 INJECTION, EMULSION INTRAVENOUS at 08:21

## 2025-09-03 RX ADMIN — LIDOCAINE HYDROCHLORIDE 20 MG: 20 INJECTION, SOLUTION EPIDURAL; INFILTRATION; INTRACAUDAL; PERINEURAL at 08:20

## 2025-09-03 RX ADMIN — PROPOFOL 150 MCG/KG/MIN: 10 INJECTION, EMULSION INTRAVENOUS at 08:20

## 2025-09-03 RX ADMIN — SODIUM CHLORIDE: 9 INJECTION, SOLUTION INTRAVENOUS at 08:15

## 2025-09-03 ASSESSMENT — PAIN SCALES - GENERAL
PAINLEVEL_OUTOF10: 0

## 2025-09-03 ASSESSMENT — PAIN - FUNCTIONAL ASSESSMENT: PAIN_FUNCTIONAL_ASSESSMENT: 0-10

## 2025-09-03 ASSESSMENT — PAIN SCALES - WONG BAKER: WONGBAKER_NUMERICALRESPONSE: NO HURT
